# Patient Record
Sex: FEMALE | Race: BLACK OR AFRICAN AMERICAN
[De-identification: names, ages, dates, MRNs, and addresses within clinical notes are randomized per-mention and may not be internally consistent; named-entity substitution may affect disease eponyms.]

---

## 2018-04-03 ENCOUNTER — HOSPITAL ENCOUNTER (EMERGENCY)
Dept: HOSPITAL 92 - ERS | Age: 58
LOS: 1 days | Discharge: HOME | End: 2018-04-04
Payer: OTHER GOVERNMENT

## 2018-04-03 DIAGNOSIS — F32.9: ICD-10-CM

## 2018-04-03 DIAGNOSIS — E03.9: ICD-10-CM

## 2018-04-03 DIAGNOSIS — Z79.899: ICD-10-CM

## 2018-04-03 DIAGNOSIS — J44.1: Primary | ICD-10-CM
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
## 2018-04-03 DIAGNOSIS — F10.129: ICD-10-CM

## 2018-04-03 LAB
ALBUMIN SERPL BCG-MCNC: 4.4 G/DL (ref 3.5–5)
ALP SERPL-CCNC: 43 U/L (ref 40–150)
ALT SERPL W P-5'-P-CCNC: 56 U/L (ref 8–55)
ANION GAP SERPL CALC-SCNC: 20 MMOL/L (ref 10–20)
AST SERPL-CCNC: 99 U/L (ref 5–34)
BILIRUB SERPL-MCNC: 0.6 MG/DL (ref 0.2–1.2)
BUN SERPL-MCNC: 5 MG/DL (ref 9.8–20.1)
CALCIUM SERPL-MCNC: 10 MG/DL (ref 7.8–10.44)
CHLORIDE SERPL-SCNC: 91 MMOL/L (ref 98–107)
CK MB SERPL-MCNC: 2.7 NG/ML (ref 0–6.6)
CO2 SERPL-SCNC: 26 MMOL/L (ref 22–29)
CREAT CL PREDICTED SERPL C-G-VRATE: 0 ML/MIN (ref 70–130)
GLOBULIN SER CALC-MCNC: 2.9 G/DL (ref 2.4–3.5)
GLUCOSE SERPL-MCNC: 96 MG/DL (ref 70–105)
HGB BLD-MCNC: 16 G/DL (ref 12–16)
MCH RBC QN AUTO: 33.7 PG (ref 27–31)
MCV RBC AUTO: 103 FL (ref 81–99)
MDIFF COMPLETE?: YES
PLATELET # BLD AUTO: 161 THOU/UL (ref 130–400)
POTASSIUM SERPL-SCNC: 4.6 MMOL/L (ref 3.5–5.1)
RBC # BLD AUTO: 4.76 MILL/UL (ref 4.2–5.4)
SODIUM SERPL-SCNC: 132 MMOL/L (ref 136–145)
TROPONIN I SERPL DL<=0.01 NG/ML-MCNC: (no result) NG/ML (ref ?–0.03)
WBC # BLD AUTO: 4.2 THOU/UL (ref 4.8–10.8)

## 2018-04-03 PROCEDURE — 94640 AIRWAY INHALATION TREATMENT: CPT

## 2018-04-03 PROCEDURE — 96374 THER/PROPH/DIAG INJ IV PUSH: CPT

## 2018-04-03 PROCEDURE — 85025 COMPLETE CBC W/AUTO DIFF WBC: CPT

## 2018-04-03 PROCEDURE — 80307 DRUG TEST PRSMV CHEM ANLYZR: CPT

## 2018-04-03 PROCEDURE — 71045 X-RAY EXAM CHEST 1 VIEW: CPT

## 2018-04-03 PROCEDURE — 82553 CREATINE MB FRACTION: CPT

## 2018-04-03 PROCEDURE — 80053 COMPREHEN METABOLIC PANEL: CPT

## 2018-04-03 PROCEDURE — 84484 ASSAY OF TROPONIN QUANT: CPT

## 2018-04-03 PROCEDURE — 93005 ELECTROCARDIOGRAM TRACING: CPT

## 2018-04-03 PROCEDURE — 36415 COLL VENOUS BLD VENIPUNCTURE: CPT

## 2018-04-03 NOTE — RAD
PORTABLE CHEST

ONE VIEW:

4/3/18

 

HISTORY: 

57-year-old female with history of dyspnea and shortness of breath. 

 

Heart size is normal. The lungs are clear. Bilateral nipple shadows are noted. No confluent pneumonia
, overt edema, or pleural effusion. 

 

IMPRESSION:  

No acute intrathoracic disease. Stable from prior study. 

 

POS: [facility]

## 2018-04-26 NOTE — EKG
Test Reason : 

Blood Pressure : ***/*** mmHG

Vent. Rate : 101 BPM     Atrial Rate : 101 BPM

   P-R Int : 126 ms          QRS Dur : 066 ms

    QT Int : 340 ms       P-R-T Axes : 079 063 082 degrees

   QTc Int : 440 ms

 

Sinus tachycardia

Biatrial enlargement

Septal infarct , age undetermined

Abnormal ECG

 

Confirmed by LUIS DANIEL DOBSON ([id number]),  CARLOS A HOANG ([id number]) on 4/26/2018 3:04:34 PM

 

Referred By:             Confirmed By:LUIS DANIEL DOBSON

## 2018-07-17 ENCOUNTER — HOSPITAL ENCOUNTER (OUTPATIENT)
Dept: HOSPITAL 92 - BICMAMMO | Age: 58
Discharge: HOME | End: 2018-07-17
Attending: INTERNAL MEDICINE
Payer: OTHER GOVERNMENT

## 2018-07-17 DIAGNOSIS — R92.1: ICD-10-CM

## 2018-07-17 DIAGNOSIS — Z80.3: ICD-10-CM

## 2018-07-17 DIAGNOSIS — Z12.31: Primary | ICD-10-CM

## 2018-07-17 PROCEDURE — 77063 BREAST TOMOSYNTHESIS BI: CPT

## 2018-07-17 PROCEDURE — 77067 SCR MAMMO BI INCL CAD: CPT

## 2020-09-15 NOTE — PDOC.HHP
Hospitalist HPI





- History of Present Illness


abd pain, shortness of breath 


History of Present Illness: 








This is a 60 year old female with history of alcohol abuse, tobacco abuse, 

crack use, COPD on home oxygen who presents to the ER with abdominal pain and 

shortness of breath. THe patient is a poor historian. She states her abdominal 

pain started yesterday. It was in the lower quadrants and describes it as 

feeling similar to her periods. SHe stated the pain radiated to her back. Her 

pain is worst with laying in certain positions. She denies vaginal bleeding.  

She started vomiting yesterday after every meal and states the vomit was  mucus

- like in consistency. .  SHe also reported burning pain with urination and 

urinary incontinence. THe patient denied eating any outside food recently. She 

had diarrhea on Friday and since then has been constipated. Her last bowel 

movement was Saturday. She has not passed any gas. 








The patient also reports shortness of breath when she talks too much, is unable 

to comment on SOB at rest versus exertion. She denies cough, chest pain, fevers

, chills, runny nose, sore throat. 





THe patient is very cachexic, reports she has lost a lot of weight in the past 

year for unclear reason.  She does not know what medicines she takes at home, 

but denies taking aspirin or any blood thinners. 


ED Course: 





The patient presented to the ER with normal BP, tachycardia with heart rate 111 

to 120. Labs showed a WBC of 11.3 and lactate of 9.0. UA showed > 50 WBC, 

turbid urine and 500 leukocyte esterase.  EKG showed sinus tachycardia. CT 

abdomen showed high grade small bowel obstruction. General surgery was consulted

, but the patient refused surgery.  She has received 3L of IV fluid while in 

the ER.  Her repeat lactate is 7.  SHe received vancomycin and cefepime while 

in the ER. 








Hospitalist ROS





- Review of Systems


Constitutional: denies: fever, chills


ENT: denies: mouth pain


Respiratory: reports: shortness of breath.  denies: cough, dry


Cardiovascular: denies: chest pain, palpitations, orthopnea


Gastrointestinal: reports: nausea, abdominal pain, constipation.  denies: 

vomiting


Genitourinary: reports: dysuria, incontinence.  denies: frequency


Musculoskeletal: reports: back pain


Skin: denies: rash, lesions


Neurological: denies: weakness, numbness





Hospitalist History





- Past Medical History


Cardiac: reports: HTN


Pulmonary: reports: COPD





- Past Surgical History


Past Surgical History: reports: Hysterectomy, Tonsillectomy





- Family History


Other Family History: 





heart problems in the family 





- Social History


Smoking Status: Current every day smoker (smokes few cigarettes daily. She 

smokes crack occasionally. She drinks 6 beers daily)





- Exam


General Appearance: NAD, awake alert


General - other findings: patient laying in bed curled up due to pain 


Eye: PERRL, anicteric sclera


ENT: normocephalic atraumatic, no oropharyngeal lesions


Neck: supple, no JVD


Heart: RRR, no murmur, no gallops, no rubs


Respiratory: CTAB, no wheezes, no rales, no ronchi


Gastrointestinal: soft


Gastrointestinal - other findings: diffuse tenderness in all four quadrants, 

mild distension, hypoactive BS


Extremities: no cyanosis, no clubbing, no edema


Skin: normal turgor, no lesions, no rashes


Neurological: cranial nerve grossly intact, normal sensation to touch, no focal 

deficits, no new deficit


Musculoskeletal: normal tone, normal strength


Musculoskeletal - other findings: diffuse cachexia 


Psychiatric: normal affect, normal behavior, A&O x 3





Hospitalist Results





- Labs


Result Diagrams: 


 09/15/20 08:15





 09/15/20 08:15


Lab results: 


 











WBC  11.4 thou/uL (4.8-10.8)  H  09/15/20  08:15    


 


Hgb  14.9 g/dL (12.0-16.0)   09/15/20  08:15    


 


Hct  48.4 % (36.0-47.0)  H  09/15/20  08:15    


 


MCV  104.0 fL (78.0-98.0)  H  09/15/20  08:15    


 


Plt Count  301 thou/uL (130-400)   09/15/20  08:15    


 


Neutrophils %  85.1 % (42.0-75.0)  H  09/15/20  08:15    


 


Sodium  139 mmol/L (136-145)   09/15/20  08:15    


 


Potassium  4.3 mmol/L (3.5-5.1)   09/15/20  08:15    


 


Chloride  92 mmol/L ()  L  09/15/20  08:15    


 


Carbon Dioxide  30 mmol/L (23-31)   09/15/20  08:15    


 


BUN  11 mg/dL (9.8-20.1)   09/15/20  08:15    


 


Creatinine  0.68 mg/dL (0.6-1.1)   09/15/20  08:15    


 


Glucose  176 mg/dL ()  H  09/15/20  08:15    


 


Lactic Acid  7.0 mmol/L (0.5-2.2)  H*  09/15/20  11:21    


 


Calcium  9.9 mg/dL (7.8-10.44)   09/15/20  08:15    


 


Total Bilirubin  0.5 mg/dL (0.2-1.2)   09/15/20  08:15    


 


AST  24 U/L (5-34)   09/15/20  08:15    


 


ALT  11 U/L (8-55)   09/15/20  08:15    


 


Alkaline Phosphatase  39 U/L ()  L  09/15/20  08:15    


 


Creatine Kinase  36 U/L ()   09/15/20  08:15    


 


Serum Total Protein  6.5 g/dL (6.0-8.3)   09/15/20  08:15    


 


Albumin  3.7 g/dL (3.4-4.8)   09/15/20  08:15    


 


Urine Ketones  Negative mg/dL (Negative)   09/15/20  08:48    


 


Urine Blood  1+  (Negative)  A  09/15/20  08:48    


 


Urine Nitrite  Negative  (Negative)   09/15/20  08:48    


 


Ur Leukocyte Esterase  500 Bernie/uL (Negative)  A  09/15/20  08:48    


 


Urine RBC  21-50 HPF (0-3)  A  09/15/20  08:48    


 


Urine WBC  Greater than 50 HPF (0-3)  A  09/15/20  08:48    


 


Ur Squamous Epith Cells  0-3 HPF (0-3)   09/15/20  08:48    


 


Urine Bacteria  2+ HPF (None Seen)  A  09/15/20  08:48    














- EKG Interpretation


EKG: 





sinus tachycardia








Hospitalist H&P A/P





- Plan


Plan: 





CT abdomen: markedly dilated fluid and air filled small bowel loops consistent 

with high grade CLARICE


Chest X ray: normal 





This is a 60 year old female with past medical history of COPD, hypertension, 

emphysema who presents with abdominal pain, found to have high grade SBO











High grade small bowel obstruction


- patient noted to have high grade obstruction on CT abdomen. SHe refused 

surgery. Currently not nauseous or vomiting, but if occurs place NG tube








Severe lactic acidosis


- lactate of 9 on presentation, likely ischemia related from high grade SBO vs 

from UTI


- she is s/p 3L of fluid


- continue IV fluids and IV antibiotics .BLood and urine culture pending. Chest 

X ray normal 








UTI


- UA consistent with UTI, s/p vanc and cefepime


- will continue empiric vanc and zosyn pending urine culture








Ascites


- likely from SBO


- continue with hydration given lactic acidosis 








Chronic respiratory failure from COPD 


- albuterol prn. Chest X ray was normal 








History of hypertension


- hold any home meds at this time








Hyperglycemia


- check hemoglobin A1C in am. Blood sugar 170's 








Diet: NPO


DVT prophylaxis: will hold for now in case she needs surgery and changes mind 


Code status: full code

## 2020-09-15 NOTE — RAD
Exam: Chest one view



HISTORY:Shortness of breath. Pain.



Comparison: 4/3/2018



FINDINGS:

Cardiac silhouette: Normal

Aorta: Unremarkable

Pulmonary vessels: Normal

Costophrenic angles: Clear



LUNGS: No masses or consolidation. Stable hyperinflation.

Pneumothorax: None



Osseous abnormalities: None



IMPRESSION: 

1. No acute cardiac pulmonary process

2. Stable hyperinflation. Emphysema. COPD.



Reported By: Mraianna Jimenez 

Electronically Signed:  9/15/2020 8:37 AM

## 2020-09-15 NOTE — CT
ABDOMEN AND PELVIC CT SCAN WITH IV CONTRAST:

 

Date:  09/15/2020

 

HISTORY:  

Abdominal pain, shortness of breath. 

 

COMPARISON:  

12/29/2018. 

 

FINDINGS:

Bilateral centrilobular emphysema changes with some minimal increased markings in the right middle lo
be and lingula, evidence for some probable chronic change. Very extensive ascites noted throughout th
e abdomen and pelvis. There is very minimal or nonexistent intra-abdominal and retroperitoneal fat wh
ich considerably lowers the sensitivity of this study. There are some markedly dilated air and fluid-
filled small bowel loops, particularly in the pelvis where there appears to be some markedly dilated 
somewhat matted appearing small bowel loops with minimal gas and fecal material in the colon which is
 somewhat decompressed, evidence for high grade small bowel obstruction. The liver, gallbladder, panc
reas, spleen, and adrenal glands are unremarkable. No evidence for renal hydronephrosis, renal calcul
us, or  obstruction. No CT evidence for acute appendicitis. 

 

IMPRESSION: 

1.  Very markedly dilated fluid and air-filled small bowel loops, including some matted appearing sma
ll bowel loops down in the pelvis with very little gas and fecal material in a nondistended colon, ev
idence for high grade small bowel obstruction. 

 

2.  Extensive ascites throughout the abdomen and pelvis. 

 

3.  No evidence for extraluminal or free intraperitoneal air. 

 

 

POS: [facility]

## 2020-09-16 NOTE — RAD
KUB:

 

COMPARISON: 

1/12/2004.  CT abdomen/pelvis 9/15/2020.

 

HISTORY: 

Followup small bowel obstruction.

 

FINDINGS: 

An anterior view of the abdomen shows air in the stomach.  There are also a few air-filled loops of s
mall bowel in the left lower quadrant of the abdomen measuring up to 4.3 cm in size.  Air and stool a
re seen in the rectum.  No suspicious calcifications are seen.

 

IMPRESSION: 

Air-filled loops of small bowel may be secondary to ileus or small bowel obstruction.

 

POS: [facility]

## 2020-09-16 NOTE — CON
DATE OF CONSULTATION:  



HISTORY OF PRESENT ILLNESS:  Zabrina Holder is a 60-year-old cachectic 

female, who is in the MICU.  I was notified she is having difficulty breathing.  She

has an acute abdomen and has declined any surgical intervention. 



She has seen Dr. Knapp in office many years ago and apparently has longstanding

history of significant tobacco abuse and significant alcohol abuse.  She also has

history of cocaine abuse in the past, particularly crack cocaine.  She has smoked a

pack a day for 30 years.  Additional information is such that she comes into the

hospital yesterday, presented with abdominal pain and shortness of breath. 



Not able to give much additional information at this stage, but previous extensive

history is outlined, pertinent for past medical history of COPD, tobacco abuse,

alcohol abuse, substance abuse, hypertension. 



PAST SURGICAL HISTORY:  Apparently hysterectomy.



HOME MEDICINE:  Includes apparently albuterol inhaler.



REVIEW OF SYSTEMS:  Difficult to obtain.



PHYSICAL EXAMINATION:

GENERAL:  Cachectic. 

VITAL SIGNS:  Temperature 98, blood pressure __________, pulse 120, sats 100%,

breathing 34 times a minute. 

CHEST:  Decreased breath sounds.  No wheezing. 

CARDIAC:  Normal S1, S2.  No gallops. 

ABDOMEN:  Distended, soft.



DIAGNOSTIC STUDIES:  White count 19,000, H and H of 10 and 30.  Lytes are normal.

Urine is growing Enterococcus.  TSH is normal.  Lactic acid was markedly elevated.

BNP was normal. 



X-ray shows free air possibly pleural effusion, marked hyperinflation.  End-stage

COPD, respiratory failure, acute abdomen, sepsis syndrome, cachexia, alcohol abuse,

tobacco abuse.  Apparently, the patient has refused surgery.  She was started on

Zosyn, thiamine, vancomycin, refusing neb treatments. 



Palliative Care is going to talk to the patient and the family.  Clearly, I am

concerned that she is going to have a cardiopulmonary arrest because of sepsis and

because of her severe multiorgan dysfunction. 



We will follow while in the MICU.  We will notify Dr. Knapp, who has seen her in

the past.  She did not want any surgical intervention.  Hopefully, we can make her

comfort care, DNR. 



This is a consultation note, 70 minutes, 50% direct patient care.







Job ID:  [id number]

## 2020-09-16 NOTE — PDOC.HOSPP
- Subjective


Encounter Date: 09/16/20


Encounter Time: 09:30


Subjective: 








 The patient states she denies abdominal pain but has pain when you touch her 

abdomen or she moves too much. She thinks she passed gas once last night. She 

has not had a bowel movement yet or passed gas today 








She has no nausea or vomiting. She is still not interested in surgery





The patient states she feels short of breath,  is requesting that she get her 

symbicort 








- Objective


Vital Signs & Weight: 


                             Vital Signs (12 hours)











  Temp Pulse Ox


 


 09/16/20 13:35   99


 


 09/16/20 11:22  98.7 F 


 


 09/16/20 07:00  98.6 F 








                                     Weight











Admit Weight                   73 lb 11.2 oz


 


Weight                         73 lb 11.2 oz











                            Most Recent Monitor Data











Heart Rate from ECG            119


 


NIBP                           115/79


 


NIBP BP-Mean                   91


 


Respiration from ECG           35


 


SpO2                           100














I&O: 


                                        











 09/15/20 09/16/20 09/17/20





 06:59 06:59 06:59


 


Intake Total  882 


 


Balance  882 











Result Diagrams: 


                                 09/16/20 11:29





                                 09/16/20 03:30


Additional Labs: 


                                   Accuchecks











  09/16/20 09/16/20 09/16/20





  12:18 06:23 00:13


 


POC Glucose  150 H  111 H  121 H














  09/15/20





  20:53


 


POC Glucose  128 H














Hospitalist ROS





- Review of Systems


Constitutional: denies: fever, chills





- Medication


Medications: 


Active Medications











Generic Name Dose Route Start Last Admin





  Trade Name Phongq  PRN Reason Stop Dose Admin


 


Famotidine  20 mg  09/15/20 21:00  09/16/20 09:32





  Pepcid  SLOW IVP   20 mg





  Q12HR KOSTAS   Administration


 


Sodium Chloride  1,000 mls @ 100 mls/hr  09/15/20 12:45  09/16/20 09:32





  Normal Saline 0.9%  IV   1,000 mls





  .Q10H KOSTAS   Administration


 


Piperacillin Sod/Tazobactam  100 mls @ 200 mls/hr  09/15/20 14:00  09/16/20 

09:32





  Sod 3.375 gm/ Sodium Chloride  IVPB   100 mls





  0200,0800,1400,2000 KOSTAS   Administration


 


Vancomycin HCl 1 gm/ Device  200 mls @ 200 mls/hr  09/15/20 21:00  09/16/20 

09:32





  IVPB   200 mls





  Q12HR [facility]OSTAS   Administration


 


Folic Acid 0.4 mg/ Syringe  0.08 mls @ 0 mls/hr  09/16/20 09:00  09/16/20 09:32





  SC   0.08 mls





  DAILY [facility]OSTAS   Administration


 


Thiamine HCl 100 mg/ Sodium  51 mls @ 100 mls/hr  09/15/20 23:59  09/16/20 02:07





  Chloride  IVPB   51 mls





  Q24HR [facility]OSTAS   Administration


 


Mometasone Furoate/Formoterol Fumar  1 puff  09/16/20 12:50  09/16/20 13:05





  Mometasone 200 Mcg/Formoterol 5 Mcg 120 Puff Inhaler  INH  09/16/20 14:00  Not

Given





  1250 KOSTAS  














- Exam


General Appearance: NAD, awake alert


Eye: PERRL, anicteric sclera


ENT: normocephalic atraumatic, no oropharyngeal lesions


Neck: no JVD


Heart: RRR, no murmur, no gallops, no rubs


Respiratory: CTAB, no wheezes, no rales, no ronchi


Gastrointestinal - other findings: firm, distended, diffusely tender to 

palpation with some guarding 


Extremities: no edema


Skin: normal turgor, no lesions, no rashes


Neurological: cranial nerve grossly intact, normal sensation to touch, no focal 

deficits, no new deficit





Hosp A/P





- Plan








CT abdomen: markedly dilated fluid and air filled small bowel loops consistent 

with high grade CLARICE


Chest X ray: normal 


Chest X ray: small right pleural effusion 


Abdominal X ray: ileus versus SBO 





This is a 60 year old female with past medical history of COPD, hypertension, 

emphysema who presents with abdominal pain, found to have high grade SBO











High grade small bowel obstruction


- patient noted to have high grade obstruction on CT abdomen. SHe refused 

surgery. 


- abdominal X ray shows persistent high grade obstruction but has no pain or na

usea so do trial of clear liquids 








Severe lactic acidosis


- normalized with IV fluids


- will trial clear liquid diet





Enterococcus UTI


- UA consistent with UTI, s/p vanc and cefepime


- will continue empiric vanc and zosyn, urine shows enterococcus. Pending urine 

culture sensitivities





Shortness of breath


- chest X ray shows questionable free air, but doubt it 


- mild left pleural effusion noted. Will d/c IV fluids








Ascites


- likely from SBO


-will d/c IV fluids 








Chronic respiratory failure from COPD 


- albuterol prn. Added symbicort 








History of hypertension


- hold any home meds at this time








Hyperglycemia


- check hemoglobin A1C in am. Blood sugar 170's 








Diet: NPO


DVT prophylaxis: will hold for now in case she needs surgery and changes mind 


Code status: full code

## 2020-09-16 NOTE — RAD
SINGLE VIEW CHEST:

 

Date:  09/16/2020

 

COMPARISON:  

CT abdomen/pelvis dated 09/15/2020. 

 

HISTORY:  

Shortness of breath. 

 

FINDINGS:

Single view of the chest shows normal sized cardiomediastinal silhouette. There is volume loss in the
 right thorax. There may be a small right pleural effusion. Bilateral round symmetric opacities proje
cting over the lower lobes likely represent nipple shadows. No pneumothorax is seen. Scarring is seen
 in both lung apices. 

 

Lucency beneath the left hemidiaphragm is concerning for possible free air beneath the left hemidiaph
ragm. 

 

IMPRESSION: 

1.  Possible small right pleural effusion. 

2.  Possible free air beneath the left hemidiaphragm. This could be artifactual and represent superim
position of the gastric bubble with the lower lung. 

 

Nurse, Vidal, in the patient's unit was notified of the findings at 1112 hours on 09/16/2020. 

 

Code CR.

 

POS: [facility]

## 2020-09-17 NOTE — PDOC.HOSPP
- Subjective


Encounter Date: 09/17/20


Encounter Time: 08:06


Subjective: 








 The patient was initially noted to be lethargic. She opens eyes to command, but

does not answer questions. Five minutes later she was more alert





When I asked if her she wanted the surgery, she appeared to shake her head no . 

I asked her if she still wanted CPR/breathing tube if she stopped breathing, 

patient was not really able to answer the question and seemed to ignore the 

question. 








I spoke with the mother who is power of  who stated she will try to come

visit, but if she gets worst to make her DNR if patient isn't competent.  Mother

states that she has been trying to get the patient to come home and stay with 

her but the patient refused and wanted to go to rehab








- Objective


Vital Signs & Weight: 


                             Vital Signs (12 hours)











  Temp Pulse Ox


 


 09/17/20 07:38  99.0 F 


 


 09/17/20 03:54  98.4 F 


 


 09/16/20 23:43  98.4 F 


 


 09/16/20 20:00   100








                                     Weight











Admit Weight                   73 lb 11.2 oz


 


Weight                         78 lb











                            Most Recent Monitor Data











Heart Rate from ECG            117


 


NIBP                           90/59


 


NIBP BP-Mean                   69


 


Respiration from ECG           24


 


SpO2                           100














I&O: 


                                        











 09/16/20 09/17/20 09/18/20





 06:59 06:59 06:59


 


Intake Total 882 900 


 


Output Total  0 


 


Balance 882 900 











Result Diagrams: 


                                 09/17/20 03:03





                                 09/17/20 03:03


Additional Labs: 


                                   Accuchecks











  09/17/20 09/17/20 09/16/20





  05:59 00:23 18:32


 


POC Glucose  92  108 H  100














  09/16/20





  12:18


 


POC Glucose  150 H














Hospitalist ROS





- Review of Systems


Constitutional: denies: fever, chills





- Medication


Medications: 


Active Medications











Generic Name Dose Route Start Last Admin





  Trade Name Freq  PRN Reason Stop Dose Admin


 


Famotidine  20 mg  09/15/20 21:00  09/16/20 20:48





  Pepcid  SLOW IVP   20 mg





  Q12HR KOSTAS   Administration


 


Fentanyl  25 mcg  09/16/20 19:24  09/17/20 05:30





  Fentanyl 100 Mcg/2 Ml Vial  SLOW IVP   25 mcg





  Q3H PRN   Administration





  Severe Pain (7-10)  


 


Folic Acid 0.4 mg/ Syringe  0.08 mls @ 0 mls/hr  09/16/20 09:00  09/16/20 09:32





  SC   0.08 mls





  DAILY KOSTAS   Administration


 


Thiamine HCl 100 mg/ Sodium  51 mls @ 100 mls/hr  09/15/20 23:59  09/17/20 00:19





  Chloride  IVPB   51 mls





  Q24HR KOSTAS   Administration


 


Mometasone Furoate/Formoterol Fumar  1 puff  09/16/20 18:30  09/17/20 07:27





  Mometasone 200 Mcg/Formoterol 5 Mcg 120 Puff Inhaler  INH   1 puff





  BID-RT KOSTAS   Administration














- Exam


General Appearance: NAD, awake alert


Eye: PERRL, anicteric sclera


ENT: normocephalic atraumatic, no oropharyngeal lesions


Neck: no JVD


Heart: RRR, no murmur, no gallops, no rubs


Respiratory: CTAB, no wheezes, no rales, no ronchi


Respiratory - other findings: tachycardic and tachypneic 


Gastrointestinal: soft, normal bowel sounds, diminished bowl sounds


Gastrointestinal - other findings: belly is firm, tender to palpation with some 

guarding 


Extremities: no cyanosis, no clubbing, no edema


Skin: normal turgor, no lesions, no rashes


Neurological: cranial nerve grossly intact, normal sensation to touch, no new 

deficit





Hosp A/P





- Plan








CT abdomen: markedly dilated fluid and air filled small bowel loops consistent 

with high grade CLARICE


Chest X ray: normal 


Chest X ray: small right pleural effusion 


Abdominal X ray: ileus versus SBO 





This is a 60 year old female with past medical history of COPD, hypertension, 

emphysema who presents with abdominal pain, found to have high grade SBO











High grade small bowel obstruction


- patient noted to have high grade obstruction on CT abdomen. SHe is refusing 

surgery


- repeat abdominal X ray 


-WBC is increasing, will switch antibiotics to meropenem 


- will place palliative care consult





Severe lactic acidosis


- normalized with IV fluids








Enterococcus Feacalis UTI


- UA shows 50-75, 000 colonies but patient had symptomatic dysuria


- will dc zosyn. Will switch from zosyn to meropenem 








Shortness of breath


- chest X ray shows questionable free air, but doubt it 09/16. REpeat chest  XRa

ytoday 











Ascites


- likely from SBO


- discontinued IV fluids 








Chronic respiratory failure from COPD 


- albuterol prn. Added symbicort 








History of hypertension


- hold any home meds at this time








Hyperglycemia


- check hemoglobin A1C in am. Blood sugar 170's 








Diet: NPO


DVT prophylaxis: will hold for now in case she needs surgery and changes mind 


Code status: full code

## 2020-09-17 NOTE — PQF
CLINICAL DOCUMENTATION CLARIFICATION FORM:









             Dear Dr. Ojeda                   Date: 9/17/20

Please exercise your independent, professional judgment in responding to the 
clarification form. 

Clinical indicators are provided on the bottom of this form for your review.



Please check appropriate box(es):



[  ] Protein Calorie Malnutrition:    [  ] Mild      [  ] Moderate   [ X ] 
Severe 

 

[  ] Other Malnutrition (please specify) 
_________________________________________



[  ] Underweight without malnutrition



[  ] Cachexia 



[  ] Other diagnosis ___________



[  ] Unable to determine





In addition, please specify:

Present on Admission (POA):  [  ] Yes             [  ] No             [  ] 
Unable to determine



For continuity of documentation, please document condition throughout progress 
notes and discharge summary.  Thank You.





DIETARY ASSESSEMENT 9/16: "PATIENT CLEARLY CACHECTIC, FAT LOSS AND MUSCLE 
WASTING TO ENTIRE BODY EASILY OBSERVED."

"SEVERE MUSCLE WASTING AND FAT LOSS OBSERVED SUGGESTIVE OF SEVERE MALNUTRITION 
IN THE CONTEXT OF CHRONIC ILLNESS."



BMI 12.2



RISKS:

HIGH GRADE BOWEL OBSTRUCTION (H&P- CARON)

CRACK, ALCOHOL, AND TOBACCO ABUSE (H&P- [facility]ON)

PER PATIENT- "HAS LOST A LOT OF WEIGHT IN THE PAST YEAR"





TREATMENT:

RECOMMENDATION OF INITIATING TPN (DIETARY ASSESSMENT 9/16)

STANDARD NUTRITIONAL SUPPLEMENTS RECOMMENDED WHEN MEDICALLY APPROPRIATE (DIETARY
ASSESSMENT 9/16)

















Moderate Malnutrition (in acute illness)

   Energy Intake: <75% of estimated energy requirement for > 7 days

   Weight Loss:  1-2%/1 week; 5%/ 1 month; 7.5%/3 months

   Other: mild body fat loss; mild muscle mass loss; mild fluid accumulation; 

Severe Malnutrition (in acute illness)

   Energy Intake: = 50% of estimated energy requirement for = 5 days

   Weight Loss: >2%/1 week; >5%/1 month; >7.5%/3 months

   Other: moderate body fat loss; moderate muscle mass loss; moderate- severe 
fluid accumulation; measurably reduced  strength

Moderate Malnutrition (in chronic illness)

   Energy Intake: <75% of estimated energy requirement for =1 month

   Weight Loss: 5%/1 month; 7.5%/3 months; 10%/6 months; 20%/1 year

   Other: mild body fat loss; mild muscle mass loss; mild fluid accumulation

Severe Malnutrition (in chronic illness)

   Energy Intake: =75% of estimated energy requirement for =1 month

   Weight Loss: >5%/1 month; >7.5%/3 months; >10%/6 months; >20%/1 year

   Other: severe body fat loss; severe muscle mass loss; severe fluid 
accumulation; measurably reduced  strength























CDS Signature:  Elena Rockwell RN           Phone #:  [phone]               
       Date: 9/17/20



                 This is a permanent part of the Medical Record

[facility]

## 2020-09-17 NOTE — RAD
EXAM: Single view of the abdomen



HISTORY: Small bowel obstruction



COMPARISON: 9/16/2020



FINDINGS: Single view of the abdomen shows stable air-filled loops of small bowel in the abdomen. Air
 is seen to the level of the rectum.  No suspicious calcifications are seen.   The bones are

unremarkable.



IMPRESSION: Stable exam



Reported By: Gustavo Briseno 

Electronically Signed:  9/17/2020 8:20 AM

## 2020-09-17 NOTE — PRG
DATE OF SERVICE:  09/17/2020



SUBJECTIVE:  Zabrina Holder is a 60-year-old female, who this morning denies any

pain or difficulty breathing. 



OBJECTIVE:  VITAL SIGNS:  Her saturations in fact are 100% on 2 L, temperature 99,

blood pressure 107/67, respirations 31, pulse 90. 

CHEST:  No wheezing.  No crackles. 

CARDIAC:  Normal S1 and S2.  No gallops. 

ABDOMEN:  No masses.



LABORATORY DATA:  White count 20,000, H and H of 9 and 32, platelet count is normal.

 Lytes are normal. 



Urine is growing Enterococcus.



ASSESSMENT AND PLAN:  Abdominal sepsis, end-stage chronic obstructive pulmonary

disease, alcohol and tobacco abuse. 



She is still not inclined to have surgical intervention. 



Pulmonary wise, we will continue neb treatments, supportive care. 



Prognosis remains poor.







Job ID:  [id number]

## 2020-09-17 NOTE — PQF
CLINICAL DOCUMENTATION CLARIFICATION FORM:







             Dear Dr. Ojeda                            Date: 9/17/20

Please exercise your independent, professional judgment in responding to the 
clarification form. 

Clinical indicators are provided on the bottom of this form for your review.



Please check appropriate box(es):



[ X ] Sepsis due to: __intra-abdominal infection from 
SBO_______________________________________

      

[  ] Severe sepsis with associated acute organ dysfunction: 

   [  ] Acute Respiratory Failure   [  ] Acute Kidney injury w/o ATN     [  ] 
Acute Kidney Injury w ATN 

   [  ] Encephalopathy (metabolic) (septic)   [  ] Disseminated Intravascular 
Coagulopathy (DIC) [  ] Hepatic Failure

   [  ] Additional/Other: please 
specify:____________________________________________   

 

[  ] Localized infection without sepsis



[  ] SIRS due to non-infectious process (please specify etiology) 
____________________________________

   [  ] with organ dysfunction     [  ] without organ dysfunction



[  ] Other diagnosis ___________



[  ] Unable to determine



In addition, please specify:

Present on Admission (POA):  [  ] Yes             [  ] No             [  ] 
Unable to determine



CONSULTATION NOTE 9/16- (AMIN):  "SEPSIS SYNDROME"



PULSE 118

RR 26



WBC 9/15:  11.4

WBC 9/17:  23.7



LACTIC ACID 9/15: 9.2



RISKS:

HIGH GRADE BOWEL OBSTRUCTION (PN 9/16- CARON)

ENTEROCOCCUS UTI (PN 9/16- CARON)





TREATMENT:

IV FLUIDS (ER)

IV VANCOMYCIN (ER-9/16)

IV ZOSYN (9/15-9/17)

IV CEFEPIME (ER)

IV MERREM (START 9/17)

URINE AND BLOOD CULTURES 9/15

















CDS Signature:   Elena Rockwell RN       Phone #: [phone]            Date: 
9/17/20





                 This is a permanent part of the Medical Record

[facility]

## 2020-09-17 NOTE — RAD
PORTABLE SUPINE CHEST:

 

HISTORY: 

Assess pleural effusion.  Free air described on yesterday's exam.

 

COMPARISON: 

Comparison is made to 9/16/2020.

 

FINDINGS: 

The supine projection limits evaluation of free intraperitoneal air.  If there is concern of free air
, recommend abdominal survey with either upright or decubitus views.

 

A hazy infiltrate and effusion on the right again noted.  The left lung appears clear.  There is gas 
seen in the stomach.  No evidence of free air on this limited supine projection.

 

IMPRESSION: 

1.  Hazy infiltrate in the right lung with a right effusion again noted.

 

2.  If there is concern of free intraperitoneal air, recommend dedicated 2-view abdominal exam with e
ither upright or decubitus views of abdomen.

 

POS: AGW

## 2020-09-18 NOTE — DIS
DATE OF ADMISSION:  09/15/2020



DATE OF DISCHARGE:  2020



DATE OF EXPIRATION:  2020.



DISCHARGE DIAGNOSES:  

1. Severe sepsis secondary to intraabdominal infection from high-grade small-bowel

obstruction. 

2. Enterococcus faecalis urinary tract infection.

3. Shortness of breath.

4. Ascites.

5. Chronic respiratory failure from chronic obstructive pulmonary disease.



CONSULTATIONS:  

1. General surgery with Dr. Waldemar Uribe.

2. Dr. Clyde Gracia, critical care.



PROCEDURES:  None.



BRIEF HISTORY OF PRESENT ILLNESS:  This is a 60-year-old female with past medical

history of COPD, tobacco abuse, crack use, who presented to the emergency room with

abdominal pain and shortness of breath.  She stated that it was in her lower

quadrant and felt similar to her periods.  She reported vomiting after every meal.

She also reported shortness of breath at rest.  She was found to be tachycardic with

heart rate of 111 to 120.  White count was 11.3 and lactate was 9.  UA showed turbid

urine.  CT of her abdomen showed a high-grade small-bowel obstruction.  General

Surgery was consulted and recommended that the patient have surgery; however, the

patient refused.  She was treated with IV vancomycin and cefepime and admitted for

further workup. 

1. Severe sepsis secondary to intraabdominal infection from Enterococcus faecalis

urinary tract infection and high-grade small bowel obstruction:  The patient was

started on vancomycin and Zosyn.  Her urine culture grew 50,000 to 75,000

Enterococcus.  Vancomycin discontinued, and she was continued on Zosyn.  Her white

count increased following day, so she switched from Zosyn to meropenem.  The patient

had a repeat abdominal x-ray, which showed persistent ileus versus obstruction.  The

patient refused NG tube placement and she was not nauseous.  She was given a clear

liquid diet, which the patient did not tolerate.  The patient continued to refuse

surgery on a routine basis.  She eventually started to have more labored breathing

and progressively became more lethargic.  Palliative Care was consulted.  The

patient was initially a full code; however, when the patient became more lethargic,

she was not felt to have capacity.  Her mother, who is a power of , made her

a DNR after felt like the patient appeared to be dying.  The patient was noted to

have asystole on her monitor at  hours with no palpable pulses.  She was

pronounced at  hours. 

2. Lactic acidosis:  The patient presented with lactate of 9.  This resolved after

administration of IV fluids. 

3. Shortness of breath:  This is likely secondary to her small-bowel obstruction:

Her chest x-ray showed questionable free air.  Repeat chest x-ray on  showed a

right lung infiltrate with a right-sided effusion. 



DISCHARGE PHYSICAL EXAMINATION:  The patient .



LABORATORY DATA:  On :  White count 22.7, hemoglobin 9.8, hematocrit 32.2, and

platelet count 209. 



BMP on  was normal. 



LFTs on :  AST 42, ALT 12, and alkaline phosphatase 37. 



Vitamin B12:  361. 



Folate:  11.2. 



TSH:  0.6. 



UA on 09/15:  Turbid urine, 100 protein, 1+ blood, greater than 50 white blood

cells, 21 to 50 rbc's, and 500 leukocyte esterase. 



COVID PCR on 09/15:  Negative.



IMAGING:  Chest x-ray on :  Shows stable hyperinflation.  Emphysema.  COPD. 



CT abdomen and pelvis on 09/15:  Showed very markedly dilated fluid and air-filled

small bowel loops including some matted-appearing small bowel loops down in the

pelvis with very little gas and fecal material and nondistended colon, evidence for

high-grade small-bowel obstruction. 



Extensive ascites throughout the abdomen and pelvis. 



Abdominal x-ray on :  Air-filled loops of small bowel may be secondary to ileus

or small-bowel obstruction. 



Chest x-ray on :  Possible small right pleural effusion, possible free air

beneath the left hemidiaphragm. 



Chest x-ray on :  Hazy infiltrate in the right lung with a right effusion. 



Abdominal x-ray on :  Air-filled loops of small bowel with air seen to level of

the rectum. 







Job ID:  [id number]

## 2020-09-19 NOTE — EKG
Test Reason : 

Blood Pressure : ***/*** mmHG

Vent. Rate : 102 BPM     Atrial Rate : 102 BPM

   P-R Int : 090 ms          QRS Dur : 064 ms

    QT Int : 342 ms       P-R-T Axes : 080 071 069 degrees

   QTc Int : 445 ms

 

Sinus tachycardia with short ME

Biatrial enlargement

Septal infarct , age undetermined

Abnormal ECG

 

Confirmed by ELLIOT THORNTON DO ([id number]),  CELIO CASTILLO ([id number]) on 9/19/2020 1:40:08 PM

 

Referred By:             Confirmed By:ELLIOT THORNTON DO